# Patient Record
Sex: MALE | Race: BLACK OR AFRICAN AMERICAN
[De-identification: names, ages, dates, MRNs, and addresses within clinical notes are randomized per-mention and may not be internally consistent; named-entity substitution may affect disease eponyms.]

---

## 2018-03-02 ENCOUNTER — HOSPITAL ENCOUNTER (EMERGENCY)
Dept: HOSPITAL 62 - ER | Age: 33
Discharge: HOME | End: 2018-03-02
Payer: SELF-PAY

## 2018-03-02 VITALS — DIASTOLIC BLOOD PRESSURE: 80 MMHG | SYSTOLIC BLOOD PRESSURE: 110 MMHG

## 2018-03-02 DIAGNOSIS — R14.2: ICD-10-CM

## 2018-03-02 DIAGNOSIS — R51: ICD-10-CM

## 2018-03-02 DIAGNOSIS — R10.13: Primary | ICD-10-CM

## 2018-03-02 PROCEDURE — 96374 THER/PROPH/DIAG INJ IV PUSH: CPT

## 2018-03-02 PROCEDURE — 99284 EMERGENCY DEPT VISIT MOD MDM: CPT

## 2018-03-02 PROCEDURE — 96361 HYDRATE IV INFUSION ADD-ON: CPT

## 2018-03-02 NOTE — ER DOCUMENT REPORT
ED General





- General


Chief Complaint: Epigastric Pain


Stated Complaint: EPIGASTRIC PAIN


Time Seen by Provider: 03/02/18 07:12


Mode of Arrival: Ambulatory


Information source: Patient


Cannot obtain history due to: Uncooperative


Notes: 


Patient is a 32-year-old male who presents to the ER today for 2 days of upper 

abdominal pain and 1 day of headache.  Patient states that he has been seen 2 

months ago for upper abdominal pain but that it "went away."  Patient states 

that he has never seen a gastroenterologist for this or had an endoscopy.  He 

admits to a lot of belching over the past 2 days, denies that the pain radiates 

anywhere.  He has not tried anything for the pain.  He denies any light or 

sound sensitivity with his headache, history of migraines.  He states that he 

has been eating and drinking less because it "hurt my stomach."


TRAVEL OUTSIDE OF THE [place] IN LAST 30 DAYS: No





- Related Data


Allergies/Adverse Reactions: 


 





No Known Allergies Allergy (Verified 01/16/14 04:40)


 











Past Medical History





- General


Information source: Patient





- Social History


Smoking Status: Unknown if Ever Smoked


Family History: Reviewed & Not Pertinent





- Immunizations


Hx Diphtheria, Pertussis, Tetanus Vaccination: Yes





Review of Systems





- Review of Systems


Constitutional: No symptoms reported


EENT: No symptoms reported


Cardiovascular: No symptoms reported


Respiratory: No symptoms reported


Gastrointestinal: See HPI


Genitourinary: No symptoms reported


Male Genitourinary: No symptoms reported


Musculoskeletal: No symptoms reported


Skin: No symptoms reported


Hematologic/Lymphatic: No symptoms reported


Neurological/Psychological: See HPI





Physical Exam





- Notes


Notes: 





PHYSICAL EXAMINATION: 


GENERAL: Uncomfortable appearing, lying in dark room, but in no acute distress. 


HEAD: Atraumatic, normocephalic. 


EYES: Pupils equal round and reactive to light, extraocular movements intact, 

sclera anicteric, conjunctiva are normal. 


NECK: Normal range of motion, supple without lymphadenopathy 


LUNGS: CTAB and equal. No wheezes rales or rhonchi. 


HEART: Regular rate and rhythm without murmurs


ABDOMEN: Soft, epigastric tenderness. No guarding, no rebound 


BACK: no vertebral tenderness, normal ROM


GI/: no CVA tenderness


EXTREMITIES: Normal range of motion, no pitting edema. No cyanosis. 


NEUROLOGICAL: Cranial nerves grossly intact. Normal sensory/motor exams. 


PSYCH: Normal mood, normal affect. 


SKIN: Warm, Dry, normal turgor, no rashes or lesions noted 

















Course





- Re-evaluation


Re-evalutation: 





03/02/18 09:55


Patient feels better after GI cocktail, Carafate, and migraine cocktail with IV 

fluids and Toradol.  Patient no longer has a headache and states that the 

abdominal pain is a lot better.  I will send him home with Carafate and 

Prilosec to follow-up with gastroenterology if symptoms do not resolve.  H. 

pylori is pending at this time.





Discharge





- Discharge


Clinical Impression: 


 Epigastric pain





Condition: Stable


Disposition: HOME, SELF-CARE


Additional Instructions: 


Return immediately for any new or worsening symptoms.





Follow up with primary care provider, call tomorrow to make followup 

appointment.


Prescriptions: 


Omeprazole Magnesium [Prilosec Otc] 20 mg PO BID #40 tablet.


Sucralfate [Carafate 1 gm Tablet] 1 gm PO ACHS #40 tablet


Forms:  Return to Work


Referrals: 


MARGO MONTOYA MD [ACTIVE STAFF] - Follow up as needed

## 2018-08-08 ENCOUNTER — HOSPITAL ENCOUNTER (EMERGENCY)
Dept: HOSPITAL 62 - ER | Age: 33
Discharge: HOME | End: 2018-08-08
Payer: COMMERCIAL

## 2018-08-08 VITALS — SYSTOLIC BLOOD PRESSURE: 122 MMHG | DIASTOLIC BLOOD PRESSURE: 83 MMHG

## 2018-08-08 DIAGNOSIS — R11.2: Primary | ICD-10-CM

## 2018-08-08 DIAGNOSIS — F17.200: ICD-10-CM

## 2018-08-08 DIAGNOSIS — R10.84: ICD-10-CM

## 2018-08-08 DIAGNOSIS — R19.7: ICD-10-CM

## 2018-08-08 LAB
ADD MANUAL DIFF: NO
ALBUMIN SERPL-MCNC: 4.4 G/DL (ref 3.5–5)
ALP SERPL-CCNC: 43 U/L (ref 38–126)
ALT SERPL-CCNC: 29 U/L (ref 21–72)
ANION GAP SERPL CALC-SCNC: 12 MMOL/L (ref 5–19)
APPEARANCE UR: (no result)
APTT PPP: YELLOW S
AST SERPL-CCNC: 25 U/L (ref 17–59)
BARBITURATES UR QL SCN: NEGATIVE
BASOPHILS # BLD AUTO: 0 10^3/UL (ref 0–0.2)
BASOPHILS NFR BLD AUTO: 0.7 % (ref 0–2)
BILIRUB DIRECT SERPL-MCNC: 0.3 MG/DL (ref 0–0.4)
BILIRUB SERPL-MCNC: 0.5 MG/DL (ref 0.2–1.3)
BILIRUB UR QL STRIP: NEGATIVE
BUN SERPL-MCNC: 13 MG/DL (ref 7–20)
CALCIUM: 9.7 MG/DL (ref 8.4–10.2)
CHLORIDE SERPL-SCNC: 102 MMOL/L (ref 98–107)
CO2 SERPL-SCNC: 28 MMOL/L (ref 22–30)
EOSINOPHIL # BLD AUTO: 0.1 10^3/UL (ref 0–0.6)
EOSINOPHIL NFR BLD AUTO: 1.6 % (ref 0–6)
ERYTHROCYTE [DISTWIDTH] IN BLOOD BY AUTOMATED COUNT: 13.5 % (ref 11.5–14)
GLUCOSE SERPL-MCNC: 95 MG/DL (ref 75–110)
GLUCOSE UR STRIP-MCNC: NEGATIVE MG/DL
HCT VFR BLD CALC: 48 % (ref 37.9–51)
HGB BLD-MCNC: 16.3 G/DL (ref 13.5–17)
KETONES UR STRIP-MCNC: NEGATIVE MG/DL
LYMPHOCYTES # BLD AUTO: 1.9 10^3/UL (ref 0.5–4.7)
LYMPHOCYTES NFR BLD AUTO: 44.9 % (ref 13–45)
MCH RBC QN AUTO: 31.1 PG (ref 27–33.4)
MCHC RBC AUTO-ENTMCNC: 34 G/DL (ref 32–36)
MCV RBC AUTO: 91 FL (ref 80–97)
METHADONE UR QL SCN: NEGATIVE
MONOCYTES # BLD AUTO: 0.4 10^3/UL (ref 0.1–1.4)
MONOCYTES NFR BLD AUTO: 8.5 % (ref 3–13)
NEUTROPHILS # BLD AUTO: 1.9 10^3/UL (ref 1.7–8.2)
NEUTS SEG NFR BLD AUTO: 44.3 % (ref 42–78)
NITRITE UR QL STRIP: NEGATIVE
PCP UR QL SCN: NEGATIVE
PH UR STRIP: 8 [PH] (ref 5–9)
PLATELET # BLD: 212 10^3/UL (ref 150–450)
POTASSIUM SERPL-SCNC: 4.2 MMOL/L (ref 3.6–5)
PROT SERPL-MCNC: 7.5 G/DL (ref 6.3–8.2)
PROT UR STRIP-MCNC: NEGATIVE MG/DL
RBC # BLD AUTO: 5.25 10^6/UL (ref 4.35–5.55)
SODIUM SERPL-SCNC: 142.4 MMOL/L (ref 137–145)
SP GR UR STRIP: 1.02
TOTAL CELLS COUNTED % (AUTO): 100 %
URINE AMPHETAMINES SCREEN: NEGATIVE
URINE BENZODIAZEPINES SCREEN: NEGATIVE
URINE COCAINE SCREEN: NEGATIVE
URINE MARIJUANA (THC) SCREEN: (no result)
UROBILINOGEN UR-MCNC: NEGATIVE MG/DL (ref ?–2)
WBC # BLD AUTO: 4.2 10^3/UL (ref 4–10.5)

## 2018-08-08 PROCEDURE — 96374 THER/PROPH/DIAG INJ IV PUSH: CPT

## 2018-08-08 PROCEDURE — 99284 EMERGENCY DEPT VISIT MOD MDM: CPT

## 2018-08-08 PROCEDURE — 96372 THER/PROPH/DIAG INJ SC/IM: CPT

## 2018-08-08 PROCEDURE — 80053 COMPREHEN METABOLIC PANEL: CPT

## 2018-08-08 PROCEDURE — 74018 RADEX ABDOMEN 1 VIEW: CPT

## 2018-08-08 PROCEDURE — 81001 URINALYSIS AUTO W/SCOPE: CPT

## 2018-08-08 PROCEDURE — 36415 COLL VENOUS BLD VENIPUNCTURE: CPT

## 2018-08-08 PROCEDURE — 96361 HYDRATE IV INFUSION ADD-ON: CPT

## 2018-08-08 PROCEDURE — 85025 COMPLETE CBC W/AUTO DIFF WBC: CPT

## 2018-08-08 PROCEDURE — 80307 DRUG TEST PRSMV CHEM ANLYZR: CPT

## 2018-08-08 NOTE — RADIOLOGY REPORT (SQ)
EXAM DESCRIPTION:  KUB/ABDOMEN (SINGLE VIEW)



COMPLETED DATE/TIME:  8/8/2018 4:10 pm



REASON FOR STUDY:  Abdominal pain



COMPARISON:  None.



NUMBER OF VIEWS:  One view.



TECHNIQUE:   Supine radiographic image of the abdomen acquired.



LIMITATIONS:  None.



FINDINGS:  BOWEL GAS PATTERN: Normal bowel gas pattern. No dilated loops.

CALCIFICATIONS: No suspicious calcifications.

SOFT TISSUES: No gross mass or suggestion of organomegaly.

HARDWARE: None in the abdomen.

BONES: No acute fracture. No worrisome bone lesions.

OTHER: No other significant finding.



IMPRESSION:  NO RADIOGRAPHIC EVIDENCE FOR ACUTE ABDOMINAL DISEASE.



TECHNICAL DOCUMENTATION:  JOB ID:  [id number]

 2011 [employer]- All Rights Reserved



Reading location - IP/workstation name: [email or web address]

## 2018-08-08 NOTE — ER DOCUMENT REPORT
ED Medical Screen (RME)





- General


Chief Complaint: Abdominal Pain


Stated Complaint: ABDOMINAL PAIN


Time Seen by Provider: 08/08/18 14:55


Notes: 





33 years old male presents today with diffuse abdominal discomfort, and 

dehydration while working outside.  No fever chills or other constitutional 

symptoms.


TRAVEL OUTSIDE OF THE [place] IN LAST 30 DAYS: No





- Related Data


Allergies/Adverse Reactions: 


 





No Known Allergies Allergy (Verified 08/08/18 13:31)


 











Past Medical History





- Social History


Chew tobacco use (# tins/day): No


Frequency of alcohol use: Social


Drug Abuse: None


Renal/ Medical History: Denies: Hx Peritoneal Dialysis





- Immunizations


Hx Diphtheria, Pertussis, Tetanus Vaccination: Yes





Physical Exam





- Vital signs


Vitals: 





 











Temp Pulse Resp BP Pulse Ox


 


 98.2 F   61   14   128/89 H  97 


 


 08/08/18 13:37  08/08/18 13:37  08/08/18 13:37  08/08/18 13:37  08/08/18 13:37














Course





- Vital Signs


Vital signs: 





 











Temp Pulse Resp BP Pulse Ox


 


 98.2 F   61   14   128/89 H  97 


 


 08/08/18 13:37  08/08/18 13:37  08/08/18 13:37  08/08/18 13:37  08/08/18 13:37














- Laboratory


Result Diagrams: 


 08/08/18 15:05





 08/08/18 15:05

## 2018-08-08 NOTE — ER DOCUMENT REPORT
ED General





- General


Chief Complaint: Abdominal Pain


Stated Complaint: ABDOMINAL PAIN


Time Seen by Provider: 08/08/18 14:55


Notes: 





Patient presents with nausea vomiting diarrhea for 3 days.  He contributes this 

to after eating at a restaurant plus an hour later began to develop his 

symptoms.  He has been having intermittent diffuse crampy abdominal pain as 

well in absence of any dysuria.  No cough congestion or chest pain.  No known 

medical problems does not take any medications on a daily basis.  Has not been 

traveling around the country or camping recently.  No known sick contacts.  He 

states that he used the bathroom several times yesterday affecting his work as 

a  and did not want to go to work today so comes in for evaluation.


TRAVEL OUTSIDE OF THE [place] IN LAST 30 DAYS: No





- Related Data


Allergies/Adverse Reactions: 


 





No Known Allergies Allergy (Verified 08/08/18 13:31)


 











Past Medical History





- Social History


Smoking Status: Current Every Day Smoker


Chew tobacco use (# tins/day): No


Frequency of alcohol use: Social


Drug Abuse: None


Family History: Reviewed & Not Pertinent


Patient has suicidal ideation: No


Patient has homicidal ideation: No


Renal/ Medical History: Denies: Hx Peritoneal Dialysis





- Immunizations


Hx Diphtheria, Pertussis, Tetanus Vaccination: Yes





Review of Systems





- Review of Systems


Constitutional: No symptoms reported


EENT: No symptoms reported


Cardiovascular: No symptoms reported


Respiratory: No symptoms reported


Gastrointestinal: Abdominal pain, Diarrhea, Nausea


Genitourinary: No symptoms reported


Male Genitourinary: No symptoms reported


Musculoskeletal: No symptoms reported


Skin: No symptoms reported


Hematologic/Lymphatic: No symptoms reported


Neurological/Psychological: No symptoms reported





Physical Exam





- Vital signs


Vitals: 





 











Temp Pulse Resp BP Pulse Ox


 


 98.2 F   61   14   128/89 H  97 


 


 08/08/18 13:37  08/08/18 13:37  08/08/18 13:37  08/08/18 13:37  08/08/18 13:37














- General


General appearance: Appears well, Alert





- HEENT


Head: Normocephalic, Atraumatic


Extraocular movements intact: Yes


Pupils: PERRL





- Respiratory


Respiratory status: No respiratory distress


Chest status: Nontender


Breath sounds: Normal.  No: Rales, Rhonchi, Stridor





- Cardiovascular


Rhythm: Regular


Heart sounds: Normal auscultation


Murmur: No





- Abdominal


Inspection: Normal


Distension: No distension


Bowel sounds: Normal


Tenderness: Nontender





- Neurological


Orientation: AAOx4





Course





- Re-evaluation


Re-evalutation: 





08/08/18 17:11


Benign exam and labs within normal limits are nonsignificant will provide 

Zofran and instructed patient to take over-the-counter Pepto-Bismol.  Discussed 

if symptoms are not improving in the next 3 to to be reevaluated or sooner if 

he begins to have localized constant pain in his abdomen as well as any fevers.

  Exam not consistent with appendicitis.





- Vital Signs


Vital signs: 





 











Temp Pulse Resp BP Pulse Ox


 


 98.2 F   61   14   128/89 H  97 


 


 08/08/18 13:37  08/08/18 13:37  08/08/18 13:37  08/08/18 13:37  08/08/18 13:37














- Laboratory


Result Diagrams: 


 08/08/18 15:05





 08/08/18 15:51





Discharge





- Discharge


Clinical Impression: 


 Diarrhea





Nausea & vomiting


Qualifiers:


 Vomiting type: unspecified Vomiting Intractability: non-intractable Qualified 

Code(s): R11.2 - Nausea with vomiting, unspecified





Disposition: HOME, SELF-CARE


Instructions:  Antinausea Medication ([facility]), Diarrhea, Nonspecific ([facility]), 

Intravenous (IV) Fluids ([facility]), Vomiting ([facility])


Additional Instructions: 


Please take over-the-counter Pepto-Bismol as directed on bottle for diarrhea.


Prescriptions: 


Ondansetron [Zofran Odt 4 mg Tablet] 1 tab PO Q4H PRN #10 tab.rapdis


 PRN Reason: For Nausea/Vomiting


Forms:  Return to Work

## 2018-08-24 ENCOUNTER — HOSPITAL ENCOUNTER (EMERGENCY)
Dept: HOSPITAL 62 - ER | Age: 33
Discharge: HOME | End: 2018-08-24
Payer: COMMERCIAL

## 2018-08-24 VITALS — DIASTOLIC BLOOD PRESSURE: 64 MMHG | SYSTOLIC BLOOD PRESSURE: 109 MMHG

## 2018-08-24 DIAGNOSIS — T14.8XXA: Primary | ICD-10-CM

## 2018-08-24 DIAGNOSIS — R51: ICD-10-CM

## 2018-08-24 DIAGNOSIS — M79.1: ICD-10-CM

## 2018-08-24 DIAGNOSIS — S60.211A: ICD-10-CM

## 2018-08-24 DIAGNOSIS — S60.221A: ICD-10-CM

## 2018-08-24 DIAGNOSIS — Z71.6: ICD-10-CM

## 2018-08-24 DIAGNOSIS — Z87.81: ICD-10-CM

## 2018-08-24 DIAGNOSIS — F17.210: ICD-10-CM
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
## 2018-08-24 PROCEDURE — 99284 EMERGENCY DEPT VISIT MOD MDM: CPT

## 2018-08-24 PROCEDURE — 99406 BEHAV CHNG SMOKING 3-10 MIN: CPT

## 2018-08-24 PROCEDURE — 73130 X-RAY EXAM OF HAND: CPT

## 2018-08-24 PROCEDURE — 96372 THER/PROPH/DIAG INJ SC/IM: CPT

## 2018-08-24 PROCEDURE — 73110 X-RAY EXAM OF WRIST: CPT
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
## 2019-07-30 ENCOUNTER — HOSPITAL ENCOUNTER (EMERGENCY)
Dept: HOSPITAL 62 - ER | Age: 34
Discharge: HOME | End: 2019-07-30
Payer: SELF-PAY

## 2019-07-30 VITALS — DIASTOLIC BLOOD PRESSURE: 78 MMHG | SYSTOLIC BLOOD PRESSURE: 126 MMHG

## 2019-07-30 DIAGNOSIS — S90.412A: Primary | ICD-10-CM
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
## 2019-07-30 DIAGNOSIS — F17.200: ICD-10-CM

## 2019-07-30 DIAGNOSIS — M79.675: ICD-10-CM

## 2019-07-30 PROCEDURE — 99283 EMERGENCY DEPT VISIT LOW MDM: CPT

## 2019-07-30 NOTE — ER DOCUMENT REPORT
HPI





- HPI


Patient complains to provider of: left great toe foreign body


Time Seen by Provider: 07/30/19 04:00


Onset: This morning


Onset/Duration: Gradual, Persistent


Quality of pain: Achy, Throbbing


Severity: Mild


Pain Level: 2


Associated Symptoms: denies: Fever


Exacerbated by: Standing, Movement, Walking


Relieved by: Remaining still


Similar symptoms previously: No


Recently seen / treated by doctor: No


Notes: 





34 yr old male pt, with the listed pmh, here for left great toe pain x 1 day. 

states he was walking barefoot on carpet in his bedroom and stepped on what he 

thinks was a small piece of glass and has had pain to the bottom of his left 

great toe since. he feels like the fb or small piece of glass may still be in 

there as he is still having pain to the area and he has tried to pick it out 

himself with no success, so he came in for evaluation. no fevers, drainage, or 

bleeding. no numbness, weakness or tingling. no surgeries on this extremity. otc

meds helping and he doesn't want anything here for pain. hasn't sought care 

until now. no pain anywhere else. pt able to walk. denies intoxication. pain 

worse with movement and palpation. better with rest. no other fall or trauma or 

associated sx, last tdap <5 yrs ago. no other associated sx. no blood thinners. 

no hx of diabetes or asthma. no recent abx or steroids.  





- ROS


Systems Reviewed and Negative: Yes All other systems reviewed and negative - to 

include 10 systems, unless mentioned in the hpi





- DERM


Skin Color: Normal, Pink





Past Medical History





- General


Information source: Patient





- Social History


Smoking Status: Current Every Day Smoker


Chew tobacco use (# tins/day): No


Frequency of alcohol use: Social


Drug Abuse: None


Lives with: Spouse/Significant other


Family History: Reviewed & Not Pertinent


Patient has suicidal ideation: No


Patient has homicidal ideation: No





- Medical History


Medical History: Negative


Endocrine Medical History: Reports: None


Renal/ Medical History: Denies: Hx Peritoneal Dialysis


Traumatic Medical History: Reports: Hx Fractures - Right fifth finger





- Immunizations


Immunizations up to date: Yes


Hx Diphtheria, Pertussis, Tetanus Vaccination: Yes





Vertical Provider Document





- CONSTITUTIONAL


Notes: 





GENERAL_APPEARANCE: alert and oriented x 3, mood and affect wnl, cooperative, no

obvious discomfort. Pleasant, young black male, smiling, speaking in full 

sentences, in no sign of pain or resp distress, easily sitting up, adult female 

significant other at bedside 


  VITALS: reviewed, see vital signs table.


  HEAD: no_swelling\tenderness on the head, normocephalic, atraumatic


  NECK: supple, no_neck_tenderness. full rom and full strength. 


HEART: RRR


LUNGS: CTAB, good air exchange diffusely 


  BACK: no_back_tenderness


  EXTREMITIES: good pulse in all extremities, left foot: left great toe-plantar 

aspect has no erythema, no swelling, mild tenderness and 

no_abrasions\lacerations other than as noted; however, there is a small 

superficial excoriated area from where pt has been picking at where he thinks 

there may be a retained piece of glass or fb, no signs of cellulitis, grossly 

visible or palpable fb, no drainable fluid collection. no fluctuation, 

induration, streaking, drainage, or bleeding. Full rom and full strength. Normal

gait. good hand . brisk cap refill. no other shortening or rotation of the 

limb or obvious deformities to suggest trauma unless otherwise noted. no other 

swelling or ttp. no sign of felon, gout, septic joint, compartment syndrome, or 

pseudogout. no callor. neg kiet sign. neg moya squeeze. no foot drop. no 

subungual hematoma. no ulcerations or necrosis. 


  SKIN: warm, dry, good_color. no rash. no other grossly visible overlying skin 

changes to suggest trauma


  NEURO: motor_intact and sensory_intact in injured_extremity. cranial nerves 2-

12 intact 





- INFECTION CONTROL


TRAVEL OUTSIDE OF THE [place] IN LAST 30 DAYS: No





Course





- Re-evaluation


Re-evalutation: 





pt here for concern of a possible fb or small shard of glass in the bottom of 

his left great toe. i do not visualize or palpate any fb on exam. triage left 

foot xr was neg for anything acute per rad and reviewed by myself. informed pt 

of findings. advised it is possible a small fb may still be present but it 

should work its way out on its own over time. advised to monitor for any signs 

of infection. rice therapy. otc meds for pain. no sign of infection currently. 

neurononfocal. he is able to ambulate and didn't want any form of 

immobilization. advised sx care. epson salt soaks. advised to f/u with pcp in 1-

2 days. return for any worsening symptoms. vss. well appearing. satting well on 

ra. neurononfocal. pt understands and agrees to plan. 





On reexam, pt remained stable. nontoxic. well appearing. pain controlled. 

tolerating po. requesting to go home. neurononfocal. afebrile. vss











 Documentation achieved through voice recording which my lead to some occasional

accidental typographical errors. Extensive efforts have been made to proof read 

documentation to make sure these are the least as possible. 








                                        











 Category Date Time Status


 


 Left Foot [FOOT LEFT COMPLETE] [RAD] Stat Exams  07/30/19 03:31 Completed














- Vital Signs


Vital signs: 


                                        











Temp Pulse Resp BP Pulse Ox


 


 98.2 F   74   18   118/87 H  100 


 


 07/30/19 03:37  07/30/19 03:37  07/30/19 03:37  07/30/19 03:37  07/30/19 03:37








                                        











  Temp Pulse Pulse Resp BP BP Pulse Ox


 


 07/30/19 04:58  98.4 F   81  20   126/78 H  100


 


 07/30/19 03:37  98.2 F   74  18   118/87 H  100


 


 07/30/19 01:58  98.8 F  74   16  132/81 H   97

















- Diagnostic Test


Radiology reviewed: Image reviewed, Reports reviewed


Radiology results interpreted by me: 





                                        





Foot X-Ray  07/30/19 03:31


IMPRESSION:


 


Soft tissue injury; else, no acute findings.


 

















Discharge





- Discharge


Clinical Impression: 


 Abrasion, left great toe, initial encounter





Condition: Good


Disposition: HOME, SELF-CARE


Instructions:  Abrasions ([facility])


Additional Instructions: 


Follow-up with PCP in 1 to 2 days.  Return for any worsening symptoms. ice, 

elevate, rest the foot. neosporin to the area. tylenol or motrin as needed for 

any pain. epson salt soaks. stop picking at the area. 


Referrals: 


ROSITA ANDREA PA-C [Primary Care Provider] - Follow up as needed

## 2019-07-30 NOTE — RADIOLOGY REPORT (SQ)
EXAM DESCRIPTION:

XR FOOT 3 OR MORE VIEWS



COMPLETED DATE/TME:  07/30/2019 03:31



CLINICAL HISTORY:

34 years Male, foreign body



COMPARISON:

None.



Findings:



Known soft tissue injury; no radioopaque foreign body.  Bones,

joints, and soft tissues of the LEFT  XR FOOT 3 OR MORE VIEWS

appear otherwise intact. 



IMPRESSION:



Soft tissue injury; else, no acute findings.

## 2020-03-26 ENCOUNTER — HOSPITAL ENCOUNTER (EMERGENCY)
Dept: HOSPITAL 62 - ER | Age: 35
Discharge: HOME | End: 2020-03-26
Payer: SELF-PAY

## 2020-03-26 VITALS — SYSTOLIC BLOOD PRESSURE: 122 MMHG | DIASTOLIC BLOOD PRESSURE: 74 MMHG

## 2020-03-26 DIAGNOSIS — M54.6: Primary | ICD-10-CM

## 2020-03-26 DIAGNOSIS — M79.18: ICD-10-CM

## 2020-03-26 PROCEDURE — 96372 THER/PROPH/DIAG INJ SC/IM: CPT

## 2020-03-26 PROCEDURE — 99283 EMERGENCY DEPT VISIT LOW MDM: CPT

## 2020-03-26 NOTE — ER DOCUMENT REPORT
ED Neck/Back Problem





- General


Chief Complaint: Back Pain


Stated Complaint: BACK PAIN


Time Seen by Provider: 03/26/20 08:08


Primary Care Provider: 


ROSITA ANDREA PA-C [Primary Care Provider] - Follow up as needed


Notes: 





CHIEF COMPLAINT: Right lateral back pain for 1 day





HPI: 35-year-old male presenting for evaluation of right lateral back pain for 1

day.  Was moving a mattress yesterday.  Did not have an acute injury.  States he

started having discomfort yesterday evening.  Did not take any anti-

inflammatories.  Took an unknown muscle relaxer 1 time yesterday evening without

resolution of his pain so decided to come to the emergency department today.  

States it hurts to bend or move.  Denies incontinence of urine or bowel.  Denies

weakness in the extremities.





ROS: See HPI - all other systems were reviewed and are otherwise negative


Constitutional: no fever 


GI: no vomiting, no diarrhea, no abdominal pain


: no dysuria


Integumentary: no rash 


Allergy: no hives 


Musculoskeletal: no extremity pain or swelling, positive back pain


Neurological: no numbness/tingling, no weakness





MEDICATIONS: I agree with the patient medications as charted by the RN.





ALLERGIES: I agree with the allergies as charted by the RN.





PAST MEDICAL HISTORY/PAST SURGICAL HISTORY: Reviewed and agree as charted by RN.





SOCIAL HISTORY: Reviewed and agree as charted by RN.





FAMILY HISTORY: No significant familial comorbid conditions directly related to 

patient complaint





EXAM:


Reviewed vital signs as charted by RN.


CONSTITUTIONAL: Alert and oriented and responds appropriately to questions. 

Well-appearing; well-nourished, mild distress secondary to pain


HEAD: Normocephalic; atraumatic


EYES: Conjunctivae clear, sclerae non-icteric


ENT: normal nose; no rhinorrhea; moist mucous membranes


NECK: Supple without meningismus; non-tender; no cervical lymphadenopathy, no 

masses


CARD: RRR; no murmurs, no clicks, no rubs, no gallops; symmetric distal pulses


RESP: Normal chest excursion without splinting or tachypnea; breath sounds clear

and equal bilaterally; no wheezes, no rhonchi, no rales, pulse oximetry 98% on 

room air not hypoxic


ABD/GI: Normal bowel sounds; non-distended; soft, non-tender, no rebound, no 

guarding; no palpable organomegaly or masses.


BACK:  The back appears normal and is non-tender to palpation over the cervical 

thoracic and lumbar spine.  There is mild right lateral lower thoracic muscular 

tenderness on palpation, there is no CVA tenderness


EXT: Normal ROM in all joints; non-tender to palpation; no cyanosis, no 

effusions, no edema   


SKIN: Normal color for age and race; warm; dry; good turgor; no acute lesions 

noted


NEURO: Moves all extremities equally; Motor and sensory function intact.  

Strength equal 5/5 bilateral upper and lower extremities.  Sensation intact and 

equal bilateral upper and lower extremities.


PSYCH: The patient's mood and manner are appropriate. Grooming and personal 

hygiene are appropriate.





MDM: 35-year-old male with muscular back pain from possible injury moving naldo

ething yesterday.  He is neurologically intact no indication for imaging at this

time.  Will give Toradol in the emergency department plan to place patient on 

anti-inflammatories and a muscle relaxer with orthopedic referral


TRAVEL OUTSIDE OF THE [place] IN LAST 30 DAYS: No





- Related Data


Allergies/Adverse Reactions: 


                                        





No Known Allergies Allergy (Verified 08/08/18 13:31)


   











Past Medical History





- Social History


Smoking Status: Unknown if Ever Smoked


Family History: Reviewed & Not Pertinent


Patient has suicidal ideation: No


Patient has homicidal ideation: No


Renal/ Medical History: Denies: Hx Peritoneal Dialysis


Musculoskeletal Medical History: Reports Hx Musculoskeletal Deformity, Reports 

Hx Musculoskeletal Trauma


Traumatic Medical History: Reports: Hx Fractures - Right fifth finger





- Immunizations


Immunizations up to date: Yes


Hx Diphtheria, Pertussis, Tetanus Vaccination: Yes





Physical Exam





- Vital signs


Vitals: 





                                        











Temp Pulse Resp BP Pulse Ox


 


 97.6 F   58 L  20   118/54 L  100 


 


 03/26/20 06:53  03/26/20 06:53  03/26/20 06:53  03/26/20 06:53  03/26/20 06:53














Course





- Vital Signs


Vital signs: 





                                        











Temp Pulse Resp BP Pulse Ox


 


 97.6 F   58 L  20   118/54 L  100 


 


 03/26/20 06:53  03/26/20 06:53  03/26/20 06:53  03/26/20 06:53  03/26/20 06:53














Discharge





- Discharge


Clinical Impression: 


Acute thoracic back pain


Qualifiers:


 Back pain laterality: right Qualified Code(s): M54.6 - Pain in thoracic spine





Condition: Stable


Disposition: HOME, SELF-CARE


Additional Instructions: 


1. Warm heat to the lower back twice daily


2. no heavy lifting for 2-3 days


3. medications as prescribed, no driving on muscle relaxers


4. follow up with orthopedics for further evaluation and treatment as needed for

any continuing pain or problems, call for appt.


5. return to the ER for any onset of incontinence of urine, fever > 101 or 

worsening condition





Prescriptions: 


Cyclobenzaprine HCl [Flexeril 10 mg Tablet] 10 mg PO TIDP PRN #15 tab


 PRN Reason: 


Naproxen 500 mg PO BID PRN #14 tablet


 PRN Reason: 


Referrals: 


ROSITA ANDREA PA-C [Primary Care Provider] - Follow up as needed


STEFF DIAZ JR, DO [ACTIVE PROVISIONAL STAFF] - Follow up as needed